# Patient Record
Sex: FEMALE | Race: WHITE | NOT HISPANIC OR LATINO | Employment: OTHER | URBAN - NONMETROPOLITAN AREA
[De-identification: names, ages, dates, MRNs, and addresses within clinical notes are randomized per-mention and may not be internally consistent; named-entity substitution may affect disease eponyms.]

---

## 2018-02-16 ENCOUNTER — OUTSIDE FACILITY SERVICE (OUTPATIENT)
Dept: INTERNAL MEDICINE | Facility: CLINIC | Age: 83
End: 2018-02-16

## 2018-02-16 PROCEDURE — 99305 1ST NF CARE MODERATE MDM 35: CPT | Performed by: INTERNAL MEDICINE

## 2018-03-25 ENCOUNTER — OUTSIDE FACILITY SERVICE (OUTPATIENT)
Dept: INTERNAL MEDICINE | Facility: CLINIC | Age: 83
End: 2018-03-25

## 2018-03-25 PROCEDURE — 99308 SBSQ NF CARE LOW MDM 20: CPT | Performed by: INTERNAL MEDICINE

## 2018-05-07 ENCOUNTER — LAB REQUISITION (OUTPATIENT)
Dept: LAB | Facility: HOSPITAL | Age: 83
End: 2018-05-07

## 2018-05-07 DIAGNOSIS — Z00.00 ROUTINE GENERAL MEDICAL EXAMINATION AT A HEALTH CARE FACILITY: ICD-10-CM

## 2018-05-07 DIAGNOSIS — E86.0 DEHYDRATION: ICD-10-CM

## 2018-05-07 LAB
ANION GAP SERPL CALCULATED.3IONS-SCNC: 13 MMOL/L (ref 3–11)
BUN BLD-MCNC: 42 MG/DL (ref 9–23)
BUN/CREAT SERPL: 22.1 (ref 7–25)
CALCIUM SPEC-SCNC: 9.6 MG/DL (ref 8.7–10.4)
CHLORIDE SERPL-SCNC: 107 MMOL/L (ref 99–109)
CO2 SERPL-SCNC: 28 MMOL/L (ref 20–31)
CREAT BLD-MCNC: 1.9 MG/DL (ref 0.6–1.3)
GFR SERPL CREATININE-BSD FRML MDRD: 25 ML/MIN/1.73
GLUCOSE BLD-MCNC: 146 MG/DL (ref 70–100)
POTASSIUM BLD-SCNC: 4.2 MMOL/L (ref 3.5–5.5)
SODIUM BLD-SCNC: 148 MMOL/L (ref 132–146)

## 2018-05-07 PROCEDURE — 80048 BASIC METABOLIC PNL TOTAL CA: CPT | Performed by: INTERNAL MEDICINE

## 2018-08-05 ENCOUNTER — OUTSIDE FACILITY SERVICE (OUTPATIENT)
Dept: INTERNAL MEDICINE | Facility: CLINIC | Age: 83
End: 2018-08-05

## 2018-08-05 PROCEDURE — 99309 SBSQ NF CARE MODERATE MDM 30: CPT | Performed by: INTERNAL MEDICINE

## 2018-09-13 RX ORDER — LORAZEPAM 0.5 MG/1
TABLET ORAL
Qty: 30 TABLET | Refills: 5 | Status: SHIPPED | OUTPATIENT
Start: 2018-09-13 | End: 2018-09-17 | Stop reason: SDUPTHER

## 2018-09-17 RX ORDER — LORAZEPAM 0.5 MG/1
0.5 TABLET ORAL 3 TIMES DAILY PRN
Qty: 90 TABLET | Refills: 5 | Status: SHIPPED | OUTPATIENT
Start: 2018-09-17 | End: 2018-12-18

## 2018-09-30 ENCOUNTER — NURSING HOME (OUTPATIENT)
Dept: INTERNAL MEDICINE | Facility: CLINIC | Age: 83
End: 2018-09-30

## 2018-09-30 DIAGNOSIS — I10 ESSENTIAL HYPERTENSION: Primary | ICD-10-CM

## 2018-09-30 PROCEDURE — 99308 SBSQ NF CARE LOW MDM 20: CPT | Performed by: INTERNAL MEDICINE

## 2018-10-22 NOTE — PROGRESS NOTES
Nursing Home Follow Up       Sharad Bell, DO ?  Jaylene Polanco, APRN ?  [address] [facility],[place] [address]  Phone: [phone]  Fax: [phone] Bri Burns MD[x]   Salvador Lidia, DO ?  [address] [place] [address]  Phone: [phone]  Fax: [phone]     PATIENT NAME: Roxann Brooks                                                                          YOB: 1929           DATE OF SERVICE: 09/30/2018  FACILITY:  [] [place]   [] [facility]   [x] [facility] [place]   [] [facility]   [] Other   ______________________________________________________________________     CHIEF COMPLAINT:  Hypertension        HISTORY OF PRESENT ILLNESS:   Asians evaluated, record reviewed, case discussed with staff.  Since last visit, no reported fever, chills, abdominal pain or cardiac respiratory symptoms.  During my visit, patient presently stated that she feels [okay]      PAST MEDICAL & SURGICAL HISTORY:   · Chronic essential hypertension  · Type 2 diabetes mellitus  · Idiopathic hypothyroidism  · Osteoporosis  · B12 deficiency  · Alzheimer's dementia  · Chronic depression  · History of bimalleolar right ankle fracture      MEDICATIONS:  I have reviewed and reconciled the patients medication list in the patients chart at the skilled nursing facility today.        ALLERGIES:  No known drug allergies       SOCIAL HISTORY:  Social History     Social History   • Marital status:      Spouse name: N/A   • Number of children: N/A   • Years of education: N/A     Occupational History   • Not on file.     Social History Main Topics   • Smoking status: Not on file   • Smokeless tobacco: Not on file   • Alcohol use Not on file   • Drug use: Unknown   • Sexual activity: Not on file     Other Topics Concern   • Not on file     Social History Narrative   • No narrative on file       FAMILY HISTORY:  Reviewed, not contributory to this visit       REVIEW OF SYSTEMS:  Review of Systems   Constitutional:  Negative.    HENT: Negative.    Respiratory: Negative.    Cardiovascular: Negative.    Gastrointestinal: Negative.    Neurological: Negative.    Psychiatric/Behavioral: Negative.          PHYSICAL EXAMINATION:   VITAL SIGNS: /68, HR 74/m, RR 16/m, temperature 98.1°F, O2 sats 96% on room air    Physical Exam   Constitutional: She appears well-developed and well-nourished.   HENT:   Head: Normocephalic and atraumatic.   Eyes: Pupils are equal, round, and reactive to light. EOM are normal.   Neck: Normal range of motion. Neck supple.   Cardiovascular: Normal rate, regular rhythm and normal heart sounds.    Pulmonary/Chest: Effort normal and breath sounds normal.   Abdominal: Soft. Bowel sounds are normal.   Musculoskeletal: Normal range of motion.   Neurological: She is alert.   Skin: Skin is warm and dry.   Psychiatric: She has a normal mood and affect.         RECORDS REVIEW:   I have reviewed Asians record including labs done July 2018 which were unremarkable      ASSESSMENT   · Chronic essential hypertension  · Type 2 diabetes mellitus  · Alzheimer's dementia      PLAN  Continue encouraging by mouth fluids, repeat CBC and CMP.  Otherwise continue current management with close monitoring of blood pressure and blood glucose levels.    [x]  Discussed Patient in detail with nursing/staff, addressed all needs today.     [x]  Plan of Care Reviewed   []  PT/OT Reviewed   []  Order Changes  []  Discharge Plans Reviewed  []  Advance Directive on file with Nursing Home.   []  POA on file with Nursing Home.    [x]  Code Status listed on patients chart at the nursing home facility.         Bri Burns MD.  10/7/2018

## 2018-12-18 RX ORDER — LORAZEPAM 0.5 MG/1
0.5 TABLET ORAL NIGHTLY
Qty: 30 TABLET | Refills: 5 | Status: SHIPPED | OUTPATIENT
Start: 2018-12-18 | End: 2019-01-01

## 2018-12-19 ENCOUNTER — NURSING HOME (OUTPATIENT)
Dept: INTERNAL MEDICINE | Facility: CLINIC | Age: 83
End: 2018-12-19

## 2018-12-19 DIAGNOSIS — I10 ESSENTIAL HYPERTENSION: Primary | ICD-10-CM

## 2018-12-19 PROCEDURE — 99308 SBSQ NF CARE LOW MDM 20: CPT | Performed by: INTERNAL MEDICINE

## 2019-01-01 ENCOUNTER — NURSING HOME (OUTPATIENT)
Dept: INTERNAL MEDICINE | Facility: CLINIC | Age: 84
End: 2019-01-01

## 2019-01-01 ENCOUNTER — NURSING HOME (OUTPATIENT)
Dept: FAMILY MEDICINE CLINIC | Facility: CLINIC | Age: 84
End: 2019-01-01

## 2019-01-01 VITALS
HEART RATE: 68 BPM | SYSTOLIC BLOOD PRESSURE: 106 MMHG | RESPIRATION RATE: 16 BRPM | OXYGEN SATURATION: 95 % | WEIGHT: 143 LBS | DIASTOLIC BLOOD PRESSURE: 64 MMHG

## 2019-01-01 DIAGNOSIS — F02.80 LATE ONSET ALZHEIMER'S DISEASE WITHOUT BEHAVIORAL DISTURBANCE (HCC): ICD-10-CM

## 2019-01-01 DIAGNOSIS — Z78.9 IMPAIRED MOBILITY AND ADLS: Primary | ICD-10-CM

## 2019-01-01 DIAGNOSIS — Z74.09 IMPAIRED MOBILITY AND ADLS: Primary | ICD-10-CM

## 2019-01-01 DIAGNOSIS — E11.9 TYPE 2 DIABETES MELLITUS WITHOUT COMPLICATION, WITHOUT LONG-TERM CURRENT USE OF INSULIN (HCC): ICD-10-CM

## 2019-01-01 DIAGNOSIS — G30.1 LATE ONSET ALZHEIMER'S DISEASE WITHOUT BEHAVIORAL DISTURBANCE (HCC): ICD-10-CM

## 2019-01-01 DIAGNOSIS — I10 ESSENTIAL HYPERTENSION: ICD-10-CM

## 2019-01-01 DIAGNOSIS — R63.0 ANOREXIA: ICD-10-CM

## 2019-01-01 DIAGNOSIS — Z78.9 IMPAIRED MOBILITY AND ADLS: ICD-10-CM

## 2019-01-01 DIAGNOSIS — R63.4 WEIGHT LOSS: Primary | ICD-10-CM

## 2019-01-01 DIAGNOSIS — E03.9 ACQUIRED HYPOTHYROIDISM: ICD-10-CM

## 2019-01-01 DIAGNOSIS — Z74.09 IMPAIRED MOBILITY AND ADLS: ICD-10-CM

## 2019-01-01 DIAGNOSIS — R54 AGE-RELATED PHYSICAL DEBILITY: Primary | Chronic | ICD-10-CM

## 2019-01-01 DIAGNOSIS — R63.4 WEIGHT LOSS: ICD-10-CM

## 2019-01-01 DIAGNOSIS — R54 AGE-RELATED PHYSICAL DEBILITY: Chronic | ICD-10-CM

## 2019-01-01 DIAGNOSIS — R60.0 EDEMA OF BOTH LOWER EXTREMITIES: ICD-10-CM

## 2019-01-01 DIAGNOSIS — R63.0 DECREASED APPETITE: ICD-10-CM

## 2019-01-01 DIAGNOSIS — I10 ESSENTIAL HYPERTENSION: Primary | ICD-10-CM

## 2019-01-01 DIAGNOSIS — I87.2 CHRONIC VENOUS INSUFFICIENCY: ICD-10-CM

## 2019-01-01 DIAGNOSIS — N18.30 CKD (CHRONIC KIDNEY DISEASE) STAGE 3, GFR 30-59 ML/MIN (HCC): ICD-10-CM

## 2019-01-01 DIAGNOSIS — R05.9 COUGH: Primary | ICD-10-CM

## 2019-01-01 PROCEDURE — 99309 SBSQ NF CARE MODERATE MDM 30: CPT | Performed by: FAMILY MEDICINE

## 2019-01-01 PROCEDURE — 99308 SBSQ NF CARE LOW MDM 20: CPT | Performed by: NURSE PRACTITIONER

## 2019-01-01 PROCEDURE — 99309 SBSQ NF CARE MODERATE MDM 30: CPT | Performed by: NURSE PRACTITIONER

## 2019-01-01 PROCEDURE — 99308 SBSQ NF CARE LOW MDM 20: CPT | Performed by: INTERNAL MEDICINE

## 2019-01-01 RX ORDER — LORAZEPAM 0.5 MG/1
0.5 TABLET ORAL NIGHTLY
Qty: 30 TABLET | Refills: 5 | Status: SHIPPED | OUTPATIENT
Start: 2019-01-01

## 2019-02-06 NOTE — PROGRESS NOTES
Nursing Home Follow Up       Sharad Bell, DO ?  Jaylene Polanco, APRN ?  [address] [facility],[place] [address]  Phone: [phone]  Fax: [phone] Bri Burns MD[x]   Salvador Murillo, DO ?  [address] [place] [address]  Phone: [phone]  Fax: [phone]     PATIENT NAME: Roxann Brooks                                                                          YOB: 1929           DATE OF SERVICE: 12/19/2018  FACILITY:  [] [place]   [] [place]   [x] [facility]   [] [facility]   [] Other   ______________________________________________________________________     CHIEF COMPLAINT:  Hypertension        HISTORY OF PRESENT ILLNESS:   Asians evaluated, record reviewed, case discussed with staff.  Since last visit, no reported fever, chills, abdominal pain or cardiac respiratory symptoms.  During my visit, patient presently stated that she feels [okay]      PAST MEDICAL & SURGICAL HISTORY:   · Chronic essential hypertension  · Type 2 diabetes mellitus  · Idiopathic hypothyroidism  · Osteoporosis  · B12 deficiency  · Alzheimer's dementia  · Chronic depression  · History of bimalleolar right ankle fracture      MEDICATIONS:  I have reviewed and reconciled the patients medication list in the patients chart at the skilled nursing facility today.        ALLERGIES:  No known drug allergies       SOCIAL HISTORY:  Social History     Socioeconomic History   • Marital status:      Spouse name: Not on file   • Number of children: Not on file   • Years of education: Not on file   • Highest education level: Not on file   Social Needs   • Financial resource strain: Not on file   • Food insecurity - worry: Not on file   • Food insecurity - inability: Not on file   • Transportation needs - medical: Not on file   • Transportation needs - non-medical: Not on file   Occupational History   • Not on file   Tobacco Use   • Smoking status: Not on file   Substance and Sexual Activity   • Alcohol use:  Not on file   • Drug use: Not on file   • Sexual activity: Not on file   Other Topics Concern   • Not on file   Social History Narrative   • Not on file       FAMILY HISTORY:  Reviewed, not contributory to this visit       REVIEW OF SYSTEMS:  Review of Systems   Constitutional: Negative.    HENT: Negative.    Respiratory: Negative.    Cardiovascular: Negative.    Gastrointestinal: Negative.    Neurological: Negative.    Psychiatric/Behavioral: Negative.          PHYSICAL EXAMINATION:   VITAL SIGNS: /70, HR 84/m, RR 18/m, temperature 98.4°F, O2 sats 94% on room air    Physical Exam   Constitutional: She appears well-developed and well-nourished.   HENT:   Head: Normocephalic and atraumatic.   Eyes: EOM are normal. Pupils are equal, round, and reactive to light.   Neck: Normal range of motion. Neck supple.   Cardiovascular: Normal rate, regular rhythm and normal heart sounds.   Pulmonary/Chest: Effort normal and breath sounds normal.   Abdominal: Soft. Bowel sounds are normal.   Musculoskeletal: Normal range of motion.   Neurological: She is alert.   Skin: Skin is warm and dry.   Psychiatric: She has a normal mood and affect.         RECORDS REVIEW:   I have reviewed Asians record including labs done July 2018 which were unremarkable      ASSESSMENT   · Chronic essential hypertension  · Type 2 diabetes mellitus  · Alzheimer's dementia      PLAN  Given patient's stable condition, will continue current management, encourage by mouth fluids, monitor blood pressure and blood glucose levels.  Further plans will modified based on patient's progress.    [x]  Discussed Patient in detail with nursing/staff, addressed all needs today.     [x]  Plan of Care Reviewed   []  PT/OT Reviewed   []  Order Changes  []  Discharge Plans Reviewed  []  Advance Directive on file with Nursing Home.   []  POA on file with Nursing Home.    [x]  Code Status listed on patients chart at the nursing home facility.         Bri Burns,  MD.  12/26/2018

## 2019-02-08 NOTE — PROGRESS NOTES
Nursing Home Follow Up       Sharad Bell, DO ?  Jaylene Polanco, APRN ?  [address] [facility],[place] [address]  Phone: [phone]  Fax: [phone] Bri Burns MD[x]   Salvador Murillo, DO ?  [address] [place] [address]  Phone: [phone]  Fax: [phone]     PATIENT NAME: Roxann Brooks                                                                          YOB: 1929           DATE OF SERVICE: January 12, 2019  FACILITY:  [] [place]   [] [place]   [x] [facility]   [] [facility]   [] Other   ______________________________________________________________________     CHIEF COMPLAINT:  Hypertension        HISTORY OF PRESENT ILLNESS:   Patient evaluated, record reviewed, case discussed with staff.  Since last visit, patient continued to be comfortable and in stable condition.  No reported acute systemic issues.  During my visit, patient denied discomfort.    PAST MEDICAL & SURGICAL HISTORY:   · Chronic essential hypertension  · Type 2 diabetes mellitus  · Idiopathic hypothyroidism  · Osteoporosis  · B12 deficiency  · Alzheimer's dementia  · Chronic depression  · History of bimalleolar right ankle fracture      MEDICATIONS:  I have reviewed and reconciled the patients medication list in the patients chart at the skilled nursing facility today.        ALLERGIES:  No known drug allergies       SOCIAL HISTORY:  Social History     Socioeconomic History   • Marital status:      Spouse name: Not on file   • Number of children: Not on file   • Years of education: Not on file   • Highest education level: Not on file   Social Needs   • Financial resource strain: Not on file   • Food insecurity - worry: Not on file   • Food insecurity - inability: Not on file   • Transportation needs - medical: Not on file   • Transportation needs - non-medical: Not on file   Occupational History   • Not on file   Tobacco Use   • Smoking status: Not on file   Substance and Sexual Activity   •  Alcohol use: Not on file   • Drug use: Not on file   • Sexual activity: Not on file   Other Topics Concern   • Not on file   Social History Narrative   • Not on file       FAMILY HISTORY:  Reviewed, not contributory to this visit       REVIEW OF SYSTEMS:  Review of Systems   Constitutional: Negative.    HENT: Negative.    Respiratory: Negative.    Cardiovascular: Negative.    Gastrointestinal: Negative.    Neurological: Negative.    Psychiatric/Behavioral: Negative.        PHYSICAL EXAMINATION:   VITAL SIGNS: /70, HR 84/m, RR 18/m, temperature 98.4°F, O2 sats 94% on room air    Physical Exam   Constitutional: She appears well-developed and well-nourished.   HENT:   Head: Normocephalic and atraumatic.   Eyes: EOM are normal. Pupils are equal, round, and reactive to light.   Neck: Normal range of motion. Neck supple.   Cardiovascular: Normal rate, regular rhythm and normal heart sounds.   Pulmonary/Chest: Effort normal and breath sounds normal.   Abdominal: Soft. Bowel sounds are normal.   Musculoskeletal: Normal range of motion.   Neurological: She is alert.   Skin: Skin is warm and dry.   Psychiatric: She has a normal mood and affect.       ASSESSMENT   · Chronic essential hypertension  · Type 2 diabetes mellitus  · Alzheimer's dementia      PLAN  Continue blood pressure and blood glucose monitoring.  Continue chronic medical management as documented per record.  Further plans will modified based on patient's progress.    [x]  Discussed Patient in detail with nursing/staff, addressed all needs today.     [x]  Plan of Care Reviewed   []  PT/OT Reviewed   []  Order Changes  []  Discharge Plans Reviewed  []  Advance Directive on file with Nursing Home.   []  POA on file with Nursing Home.    [x]  Code Status listed on patients chart at the nursing home facility.       Bri Burns MD.  January 19, 2019

## 2019-04-02 PROBLEM — E11.9 TYPE 2 DIABETES MELLITUS WITHOUT COMPLICATION, WITHOUT LONG-TERM CURRENT USE OF INSULIN (HCC): Status: ACTIVE | Noted: 2019-01-01

## 2019-04-02 PROBLEM — F02.80 LATE ONSET ALZHEIMER'S DISEASE WITHOUT BEHAVIORAL DISTURBANCE (HCC): Status: ACTIVE | Noted: 2019-01-01

## 2019-04-02 PROBLEM — G30.1 LATE ONSET ALZHEIMER'S DISEASE WITHOUT BEHAVIORAL DISTURBANCE (HCC): Status: ACTIVE | Noted: 2019-01-01

## 2019-04-02 PROBLEM — I10 ESSENTIAL HYPERTENSION: Status: ACTIVE | Noted: 2019-01-01

## 2019-04-02 PROBLEM — E03.9 ACQUIRED HYPOTHYROIDISM: Status: ACTIVE | Noted: 2019-01-01

## 2019-04-02 NOTE — PROGRESS NOTES
Nursing Home Follow Up Note      Sharad Bell DO []   SP[facility] [x]  [address] [place] [address]  Phone: [phone]  Fax: [phone] Bri Burns MD []    Salvador Murillo DO []   [address] [place] [address]  Phone: [phone]  Fax: [phone]     PATIENT NAME: Roxann Brooks                                                                          YOB: 1929           DATE OF SERVICE: 04/02/2019  FACILITY:  [][place]   [] [place]   [x] [facility]   [] [facility]   [] Other ______________________________________________________________________      CHIEF COMPLAINT:  CMM and LTC      HISTORY OF PRESENT ILLNESS:   Routine visit for coordination of LTC needs and chronic conditions:HTN, DM, Dementia, Hypothyroidism    PAST MEDICAL & SURGICAL HISTORY:   No past medical history on file.   No past surgical history on file.      MEDICATIONS:  I have reviewed and reconciled the patients medication list in the patients chart at the skilled nursing facility today.      ALLERGIES:    Allergies not on file      SOCIAL HISTORY:    Social History     Socioeconomic History   • Marital status:      Spouse name: Not on file   • Number of children: Not on file   • Years of education: Not on file   • Highest education level: Not on file       FAMILY HISTORY:    No family history on file.    REVIEW OF SYSTEMS:    Review of Systems   Constitutional: Negative for activity change, appetite change, chills, diaphoresis, fatigue, fever, unexpected weight gain and unexpected weight loss.   HENT: Negative for congestion, ear pain, mouth sores, nosebleeds, postnasal drip, rhinorrhea, sinus pressure, sneezing, sore throat and trouble swallowing.    Eyes: Negative for blurred vision, pain, discharge, redness, itching and visual disturbance.   Respiratory: Negative for apnea, cough, choking, chest tightness, shortness of breath, wheezing and stridor.    Cardiovascular: Negative for  chest pain, palpitations and leg swelling.   Gastrointestinal: Negative for abdominal distention, abdominal pain, blood in stool, constipation, diarrhea, nausea, vomiting, GERD and indigestion.   Endocrine: Negative for polydipsia and polyuria.   Genitourinary: Negative for urinary incontinence, decreased urine volume, difficulty urinating, dysuria, flank pain, frequency, hematuria and urgency.   Musculoskeletal: Positive for arthralgias. Negative for back pain, gait problem, joint swelling and myalgias.   Skin: Negative for color change, dry skin, rash and skin lesions.   Allergic/Immunologic: Negative for environmental allergies.   Neurological: Positive for weakness, memory problem and confusion. Negative for dizziness, seizures and speech difficulty.   Psychiatric/Behavioral: Negative for behavioral problems, dysphoric mood, hallucinations, sleep disturbance and depressed mood. The patient is not nervous/anxious.          PHYSICAL EXAMINATION:   VITAL SIGNS: BP: 126/61    HR: 70     RR: 16     T: 97.2     Wt: 156    Physical Exam   Constitutional: She appears well-developed and well-nourished.   HENT:   Head: Normocephalic.   Right Ear: External ear normal.   Left Ear: External ear normal.   Nose: Nose normal.   Eyes: Conjunctivae are normal.   Neck: Normal range of motion.   Cardiovascular: Normal rate, regular rhythm, normal heart sounds and intact distal pulses.   Pulmonary/Chest: Effort normal and breath sounds normal. No respiratory distress. She has no wheezes. She has no rales.   Abdominal: Soft. Bowel sounds are normal. She exhibits no distension and no mass. There is no tenderness. No hernia.   Musculoskeletal: She exhibits no edema.   NROM all major joints    LE weakness   Neurological: She is alert.   Skin: Skin is warm and dry. No rash noted.   Psychiatric: She has a normal mood and affect. Her behavior is normal.   Nursing note and vitals reviewed.      RECORDS REVIEW:   I have reviewed and  interpreted the following lab test results  obtained at the time of the visit today.     ASSESSMENT     Diagnoses and all orders for this visit:    Essential hypertension    Type 2 diabetes mellitus without complication, without long-term current use of insulin (CMS/[facility])    CKD (chronic kidney disease) stage 3, GFR 30-59 ml/min (CMS/[facility])    Acquired hypothyroidism    Late onset Alzheimer's disease without behavioral disturbance        PLAN    BP at goal on current medications.    DM stable with no reports of cyclical HG. A1C 7.5 on 2/5/19.    GFR 30, Creat 1.6 on 1/8/19.     Hypothyroidism stable on current dose of Synthroid.     Alzheimer's symptoms stable with no acute behavior changes. Appetite and weight stable.     Staff to continue supportive care for all ADLs.    [x]  Discussed Patient in detail with nursing/staff, addressed all needs today.     [x]  Plan of Care Reviewed   []  PT/OT Reviewed   []  Order Changes  []  Discharge Plans Reviewed  [x]  Advance Directive on file with Nursing Home.   [x]  POA on file with Nursing Home.   [x]  Code Status listed: []  Full Code   []  DNR              SPENSER Donnelly.

## 2019-04-25 NOTE — PROGRESS NOTES
Nursing Home Follow Up Note      Sharad Bell DO []   [facility] [x]  [address] [place] [address]  Phone: [phone]  Fax: [phone] Bri Burns MD []    Salvador Murillo DO []   [address] [place] [address]  Phone: [phone]  Fax: [phone]     PATIENT NAME: Roxann Brooks                                                                          YOB: 1929           DATE OF SERVICE: 04/23/2019  FACILITY:  [][place]   [] [place]   [x] [facility]   [] [facility]   [] Other ______________________________________________________________________      CHIEF COMPLAINT:  CMM and LTC      HISTORY OF PRESENT ILLNESS:   Routine visit for coordination of LTC needs and chronic conditions:HTN, DM, Dementia, Hypothyroidism    PAST MEDICAL & SURGICAL HISTORY:   No past medical history on file.   No past surgical history on file.      MEDICATIONS:  I have reviewed and reconciled the patients medication list in the patients chart at the skilled nursing facility today.      ALLERGIES:    Allergies not on file      SOCIAL HISTORY:    Social History     Socioeconomic History   • Marital status:      Spouse name: Not on file   • Number of children: Not on file   • Years of education: Not on file   • Highest education level: Not on file       FAMILY HISTORY:    No family history on file.    REVIEW OF SYSTEMS:    Review of Systems   Constitutional: Negative for activity change, appetite change, chills, diaphoresis, fatigue, fever, unexpected weight gain and unexpected weight loss.   HENT: Negative for congestion, ear pain, mouth sores, nosebleeds, postnasal drip, rhinorrhea, sinus pressure, sneezing, sore throat and trouble swallowing.    Eyes: Negative for blurred vision, pain, discharge, redness, itching and visual disturbance.   Respiratory: Negative for apnea, cough, choking, chest tightness, shortness of breath, wheezing and stridor.    Cardiovascular: Negative for  chest pain, palpitations and leg swelling.   Gastrointestinal: Negative for abdominal distention, abdominal pain, blood in stool, constipation, diarrhea, nausea, vomiting, GERD and indigestion.   Endocrine: Negative for polydipsia and polyuria.   Genitourinary: Negative for urinary incontinence, decreased urine volume, difficulty urinating, dysuria, flank pain, frequency, hematuria and urgency.   Musculoskeletal: Positive for arthralgias. Negative for back pain, gait problem, joint swelling and myalgias.   Skin: Negative for color change, dry skin, rash and skin lesions.   Allergic/Immunologic: Negative for environmental allergies.   Neurological: Positive for weakness, memory problem and confusion. Negative for dizziness, seizures and speech difficulty.   Psychiatric/Behavioral: Negative for behavioral problems, dysphoric mood, hallucinations, sleep disturbance and depressed mood. The patient is not nervous/anxious.          PHYSICAL EXAMINATION:   VITAL SIGNS: BP: 146/71    HR: 81     RR: 18     T: 97.2     Wt: 156    Physical Exam   Constitutional: She appears well-developed and well-nourished.   HENT:   Head: Normocephalic.   Right Ear: External ear normal.   Left Ear: External ear normal.   Nose: Nose normal.   Eyes: Conjunctivae are normal.   Neck: Normal range of motion.   Cardiovascular: Normal rate, regular rhythm, normal heart sounds and intact distal pulses.   Pulmonary/Chest: Effort normal and breath sounds normal. No respiratory distress. She has no wheezes. She has no rales.   Abdominal: Soft. Bowel sounds are normal. She exhibits no distension and no mass. There is no tenderness. No hernia.   Musculoskeletal: She exhibits no edema.   NROM all major joints    LE weakness   Neurological: She is alert.   Skin: Skin is warm and dry. No rash noted.   Psychiatric: She has a normal mood and affect. Her behavior is normal.   Nursing note and vitals reviewed.      RECORDS REVIEW:   I have reviewed and  interpreted the following lab test results  obtained at the time of the visit today.     ASSESSMENT     Diagnoses and all orders for this visit:    Essential hypertension    Type 2 diabetes mellitus without complication, without long-term current use of insulin (CMS/[facility])    Acquired hypothyroidism    Late onset Alzheimer's disease without behavioral disturbance        PLAN    BP at goal on current medications.    DM stable with no reports of cyclical HG. A1C 7.5 on 2/5/19.    GFR 30, Creat 1.6 on 1/8/19.     Hypothyroidism stable on current dose of Synthroid.     Alzheimer's symptoms stable with no acute behavior changes. Appetite and weight stable.     Staff to continue supportive care for all ADLs.    [x]  Discussed Patient in detail with nursing/staff, addressed all needs today.     [x]  Plan of Care Reviewed   []  PT/OT Reviewed   []  Order Changes  []  Discharge Plans Reviewed  [x]  Advance Directive on file with Nursing Home.   [x]  POA on file with Nursing Home.   [x]  Code Status listed: []  Full Code   []  DNR              SPENS[facility] Donnelly.

## 2019-05-30 NOTE — PROGRESS NOTES
Nursing Home Follow Up Note      Sharad Bell DO []   SPENSER Haywood [x]  [address] [place] [address]  Phone: [phone]  Fax: [phone] Bri Burns MD []    Salvador Murillo DO []   [address] [place] [address]  Phone: [phone]  Fax: [phone]     PATIENT NAME: Roxann Brooks                                                                          YOB: 1929           DATE OF SERVICE: 05/28/2019  FACILITY:  [][place]   [] [place]   [x] [facility]   [] [facility]   [] Other ______________________________________________________________________      CHIEF COMPLAINT:  Cough and congestion since yesterday    CMM and LTC      HISTORY OF PRESENT ILLNESS:   Per staff, patient has had cough and congestion since yesterday. She is not coughing anything up, VS stable.     Routine visit for coordination of LTC needs and chronic conditions:HTN, DM, Dementia, Hypothyroidism    PAST MEDICAL & SURGICAL HISTORY:   No past medical history on file.   No past surgical history on file.      MEDICATIONS:  I have reviewed and reconciled the patients medication list in the patients chart at the skilled nursing facility today.      ALLERGIES:    Allergies not on file      SOCIAL HISTORY:    Social History     Socioeconomic History   • Marital status:      Spouse name: Not on file   • Number of children: Not on file   • Years of education: Not on file   • Highest education level: Not on file       FAMILY HISTORY:    No family history on file.    REVIEW OF SYSTEMS:    Review of Systems   Constitutional: Negative for activity change, appetite change, chills, diaphoresis, fatigue, fever, unexpected weight gain and unexpected weight loss.   HENT: Positive for congestion. Negative for ear pain, mouth sores, nosebleeds, postnasal drip, rhinorrhea, sinus pressure, sneezing, sore throat and trouble swallowing.    Eyes: Negative for blurred vision, pain, discharge, redness, itching  and visual disturbance.   Respiratory: Positive for cough. Negative for apnea, choking, chest tightness, shortness of breath, wheezing and stridor.    Cardiovascular: Negative for chest pain, palpitations and leg swelling.   Gastrointestinal: Negative for abdominal distention, abdominal pain, blood in stool, constipation, diarrhea, nausea, vomiting, GERD and indigestion.   Endocrine: Negative for polydipsia and polyuria.   Genitourinary: Negative for urinary incontinence, decreased urine volume, difficulty urinating, dysuria, flank pain, frequency, hematuria and urgency.   Musculoskeletal: Positive for arthralgias. Negative for back pain, gait problem, joint swelling and myalgias.   Skin: Negative for color change, dry skin, rash and skin lesions.   Allergic/Immunologic: Negative for environmental allergies.   Neurological: Positive for weakness, memory problem and confusion. Negative for dizziness, seizures and speech difficulty.   Psychiatric/Behavioral: Negative for behavioral problems, dysphoric mood, hallucinations, sleep disturbance and depressed mood. The patient is not nervous/anxious.          PHYSICAL EXAMINATION:   VITAL SIGNS: BP: 143/71    HR: 77     RR: 18     T: 97.2   O2: 94%      Wt: 155    Physical Exam   Constitutional: She appears well-developed and well-nourished.   HENT:   Head: Normocephalic.   Right Ear: External ear normal.   Left Ear: External ear normal.   Nose: Nose normal.   Eyes: Conjunctivae are normal.   Neck: Normal range of motion.   Cardiovascular: Normal rate, regular rhythm, normal heart sounds and intact distal pulses.   Pulmonary/Chest: Effort normal and breath sounds normal. No respiratory distress. She has no wheezes. She has no rales.   Abdominal: Soft. Bowel sounds are normal. She exhibits no distension and no mass. There is no tenderness. No hernia.   Musculoskeletal: She exhibits no edema.   NROM all major joints    LE weakness   Neurological: She is alert.   Skin: Skin  is warm and dry. No rash noted.   Psychiatric: She has a normal mood and affect. Her behavior is normal.   Nursing note and vitals reviewed.      RECORDS REVIEW:   I have reviewed and interpreted the following lab test results  obtained at the time of the visit today. 5/9: TSH 2.5 T4 7.7, Hgb 12.6, Hct 39.3, GFR 30, Cr 1.6    ASSESSMENT     Diagnoses and all orders for this visit:    Cough    Essential hypertension    Type 2 diabetes mellitus without complication, without long-term current use of insulin (CMS/[facility])    Acquired hypothyroidism    Late onset Alzheimer's disease without behavioral disturbance    Impaired mobility and ADLs        PLAN    Start Robitussin 5 ml q 6 hours for 1 week and albuterol nebs q 6 hours prn, for treatment of her cough and congestion . Will monitor.     BP at goal on current medications.    DM stable with no reports of cyclical HG. A1C 7.5 on 2/5/19.    GFR 30, Creat 1.6 on 5/9/19.     Hypothyroidism stable on current dose of Synthroid. See labs above.     Alzheimer's symptoms stable with no acute behavior changes. Appetite and weight stable.     Staff to continue supportive care for all ADLs.    [x]  Discussed Patient in detail with nursing/staff, addressed all needs today.     [x]  Plan of Care Reviewed   []  PT/OT Reviewed   []  Order Changes  []  Discharge Plans Reviewed  [x]  Advance Directive on file with Nursing Home.   [x]  POA on file with Nursing Home.   [x]  Code Status listed: []  Full Code   []  DNR              SPENSER Donnelly.

## 2019-06-03 NOTE — PROGRESS NOTES
Nursing Home Follow Up       Sharad Bell, DO ?  Jaylene Polanco, APRN ?  [address] [facility],[place] [address]  Phone: [phone]  Fax: [phone] Bri Burns MD[x]   Salvador Lidia, DO ?  [address] [place] [address]  Phone: [phone]  Fax: [phone]     PATIENT NAME: Roxann Brooks                                                                          YOB: 1929           DATE OF SERVICE: 3/27/2019  FACILITY:  [] [place]   [] [place]   [x] [facility]   [] [facility]   [] Other   ______________________________________________________________________     CHIEF COMPLAINT:  Hypertension        HISTORY OF PRESENT ILLNESS:   Patient evaluated, record reviewed, case discussed with staff.  Patient continues to be in stable condition with no reported fever, chills or symptoms.  During today's visit, patient was calm after waking up from sleep.  Stated that she felt [okay].      PAST MEDICAL & SURGICAL HISTORY:   · Chronic essential hypertension  · Type 2 diabetes mellitus  · Idiopathic hypothyroidism  · Osteoporosis  · B12 deficiency  · Alzheimer's dementia  · Chronic depression  · History of bimalleolar right ankle fracture      MEDICATIONS:  I have reviewed and reconciled the patients medication list in the patients chart at the skilled nursing facility today.        ALLERGIES:  No known drug allergies       SOCIAL HISTORY:  Social History     Socioeconomic History   • Marital status:      Spouse name: Not on file   • Number of children: Not on file   • Years of education: Not on file   • Highest education level: Not on file       FAMILY HISTORY:  Reviewed, not contributory to this visit       REVIEW OF SYSTEMS:  Review of Systems   Constitutional: Negative.    HENT: Negative.    Respiratory: Negative.    Cardiovascular: Negative.    Gastrointestinal: Negative.    Neurological: Negative.    Psychiatric/Behavioral: Negative.        PHYSICAL EXAMINATION:   VITAL SIGNS:  /74, HR 82/m, RR 18/m, temperature 98.2°F    Physical Exam   Constitutional: She appears well-developed and well-nourished.   HENT:   Head: Normocephalic and atraumatic.   Eyes: EOM are normal. Pupils are equal, round, and reactive to light.   Neck: Normal range of motion. Neck supple.   Cardiovascular: Normal rate, regular rhythm and normal heart sounds.   Pulmonary/Chest: Effort normal and breath sounds normal.   Abdominal: Soft. Bowel sounds are normal.   Musculoskeletal: Normal range of motion.   Neurological: She is alert.   Skin: Skin is warm and dry.   Psychiatric: She has a normal mood and affect.       ASSESSMENT   · Chronic essential hypertension  · Type 2 diabetes mellitus  · Alzheimer's dementia      PLAN  Continue blood pressure and blood glucose monitoring.  Continue chronic medical management as documented per record.  Further plans will modified based on patient's progress.     Of note, I will transition care to Dr. Bell who will follow on further plans.  Facility staff are in the process of coordinating a seamless transfer.  Meanwhile, I will follow up with any upcoming issues or concerns.    [x]  Discussed Patient in detail with nursing/staff, addressed all needs today.     [x]  Plan of Care Reviewed   []  PT/OT Reviewed   []  Order Changes  []  Discharge Plans Reviewed  []  Advance Directive on file with Nursing Home.   []  POA on file with Nursing Home.    [x]  Code Status listed on patients chart at the nursing home facility.       Bri Burns MD.  3/27/2019

## 2019-06-21 PROBLEM — R60.0 EDEMA OF BOTH FEET: Status: ACTIVE | Noted: 2019-01-01

## 2019-06-21 NOTE — PROGRESS NOTES
Nursing Home Follow Up Note      Sharad Bell DO []   SPENSER Haywood [x]  [address] [place] [address]  Phone: [phone]  Fax: [phone] Bri Burns MD []    Salvador Murillo DO []   [address] [place] [address]  Phone: [phone]  Fax: [phone]     PATIENT NAME: Roxann Brooks                                                                          YOB: 1929           DATE OF SERVICE: 6/21/2019  FACILITY:  [][place]   [] [place]   [x] [facility]   [] [facility]   [] Other ______________________________________________________________________      CHIEF COMPLAINT:  Increased edema in BLE.    CMM and LTC      HISTORY OF PRESENT ILLNESS:   Per staff, patient has increased bilateral lower extremity swelling. Patient denies SOB or wheezing.     Routine visit for coordination of LTC needs and chronic conditions:HTN, DM, Dementia, Hypothyroidism    PAST MEDICAL & SURGICAL HISTORY:   No past medical history on file.   No past surgical history on file.      MEDICATIONS:  I have reviewed and reconciled the patients medication list in the patients chart at the skilled nursing facility today.      ALLERGIES:    Allergies not on file      SOCIAL HISTORY:    Social History     Socioeconomic History   • Marital status:      Spouse name: Not on file   • Number of children: Not on file   • Years of education: Not on file   • Highest education level: Not on file       FAMILY HISTORY:    No family history on file.    REVIEW OF SYSTEMS:    Review of Systems   Constitutional: Negative for activity change, appetite change, chills, diaphoresis, fatigue, fever, unexpected weight gain and unexpected weight loss.   HENT: Negative for congestion, ear pain, mouth sores, nosebleeds, postnasal drip, rhinorrhea, sinus pressure, sneezing, sore throat and trouble swallowing.    Eyes: Negative for blurred vision, pain, discharge, redness, itching and visual disturbance.    Respiratory: Negative for apnea, cough, choking, chest tightness, shortness of breath, wheezing and stridor.    Cardiovascular: Positive for leg swelling. Negative for chest pain and palpitations.   Gastrointestinal: Negative for abdominal distention, abdominal pain, blood in stool, constipation, diarrhea, nausea, vomiting, GERD and indigestion.   Endocrine: Negative for polydipsia and polyuria.   Genitourinary: Negative for urinary incontinence, decreased urine volume, difficulty urinating, dysuria, flank pain, frequency, hematuria and urgency.   Musculoskeletal: Positive for arthralgias. Negative for back pain, gait problem, joint swelling and myalgias.   Skin: Negative for color change, dry skin, rash and skin lesions.   Allergic/Immunologic: Negative for environmental allergies.   Neurological: Positive for weakness, memory problem and confusion. Negative for dizziness, seizures and speech difficulty.   Psychiatric/Behavioral: Negative for behavioral problems, dysphoric mood, hallucinations, sleep disturbance and depressed mood. The patient is not nervous/anxious.    All other systems reviewed and are negative.        PHYSICAL EXAMINATION:   VITAL SIGNS: BP: 146/64   HR: 63    RR: 18     T: 97.2   O2: 92%      Wt: 155    Physical Exam   Constitutional: She appears well-developed and well-nourished.   HENT:   Head: Normocephalic.   Right Ear: External ear normal.   Left Ear: External ear normal.   Nose: Nose normal.   Eyes: Conjunctivae are normal.   Neck: Normal range of motion.   Cardiovascular: Normal rate, regular rhythm, normal heart sounds and intact distal pulses.   Bilateral feet +2 edema    Pulmonary/Chest: Effort normal and breath sounds normal. No respiratory distress. She has no wheezes. She has no rales.   Abdominal: Soft. Bowel sounds are normal. She exhibits no distension and no mass. There is no tenderness. No hernia.   Musculoskeletal: She exhibits no edema.   NROM all major joints    LE  weakness   Neurological: She is alert.   Skin: Skin is warm and dry. No rash noted.   Psychiatric: She has a normal mood and affect. Her behavior is normal.   Nursing note and vitals reviewed.      RECORDS REVIEW:   I have reviewed and interpreted the following lab test results  obtained at the time of the visit today. 5/9: TSH 2.5 T4 7.7, Hgb 12.6, Hct 39.3, GFR 30, Cr 1.6 BUN 33 A1C 7.7     ASSESSMENT     Diagnoses and all orders for this visit:    Edema of both lower extremities    Essential hypertension    Type 2 diabetes mellitus without complication, without long-term current use of insulin (CMS/[facility])    Late onset Alzheimer's disease without behavioral disturbance        PLAN    Start Lasix 20 mg for 3 days. Notified staff and patient to ensure she drinks adequate fluids so she does not become dehydrated.    Ordered ROSI hose for patient but if she will not wear them asked nursing staff to wrap with an ace wrap from the knee to ankle during day and take off at night.      BP at goal on current medications.    DM stable with no reports of cyclical HG. A1C 7.5 on 2/5/19.    Alzheimer's symptoms stable with no acute behavior changes. Appetite and weight stable.     Staff to continue supportive care for all ADLs.    [x]  Discussed Patient in detail with nursing/staff, addressed all needs today.     [x]  Plan of Care Reviewed   []  PT/OT Reviewed   [x]  Order Changes  []  Discharge Plans Reviewed  [x]  Advance Directive on file with Nursing Home.   [x]  POA on file with Nursing Home.   [x]  Code Status listed: []  Full Code   []  DNR              SPENSER Donnelly.

## 2019-07-02 NOTE — PROGRESS NOTES
Nursing Home Follow Up Note      Sharad Bell DO []   SPENSER Haywood [x]  [address] [place] [address]  Phone: [phone]  Fax: [phone] Bri Burns MD []    Salvador Murillo DO []   [address] [place] [address]  Phone: [phone]  Fax: [phone]     PATIENT NAME: Roxann Brooks                                                                          YOB: 1929           DATE OF SERVICE: 07/02/2019  FACILITY:  [][place]   [] [place]   [[facility]] [facility]   [] [facility]   [] Other ______________________________________________________________________      CHIEF COMPLAINT:  F/u CMM and LTC      HISTORY OF PRESENT ILLNESS:   Routine visit for coordination of LTC needs and chronic conditions:HTN, DM, Dementia, Hypothyroidism.  Nursing reports no events or needs at this time    PAST MEDICAL & SURGICAL HISTORY:   No past medical history on file.   No past surgical history on file.      MEDICATIONS:  I have reviewed and reconciled the patients medication list in the patients chart at the skilled nursing facility today.      ALLERGIES:    Allergies not on file      SOCIAL HISTORY:    Social History     Socioeconomic History   • Marital status:      Spouse name: Not on file   • Number of children: Not on file   • Years of education: Not on file   • Highest education level: Not on file       FAMILY HISTORY:    No family history on file.    REVIEW OF SYSTEMS:    Review of Systems   Constitutional: Negative for activity change, appetite change, chills, diaphoresis, fatigue, fever, unexpected weight gain and unexpected weight loss.   HENT: Negative for congestion, ear pain, mouth sores, nosebleeds, postnasal drip, rhinorrhea, sinus pressure, sneezing, sore throat and trouble swallowing.    Eyes: Negative for blurred vision, pain, discharge, redness, itching and visual disturbance.   Respiratory: Negative for apnea, cough, choking, chest tightness, shortness of breath,  wheezing and stridor.    Cardiovascular: Positive for leg swelling. Negative for chest pain and palpitations.   Gastrointestinal: Negative for abdominal distention, abdominal pain, blood in stool, constipation, diarrhea, nausea, vomiting, GERD and indigestion.   Endocrine: Negative for polydipsia and polyuria.   Genitourinary: Negative for urinary incontinence, decreased urine volume, difficulty urinating, dysuria, flank pain, frequency, hematuria and urgency.   Musculoskeletal: Positive for arthralgias. Negative for back pain, gait problem, joint swelling and myalgias.   Skin: Negative for color change, dry skin, rash and skin lesions.   Allergic/Immunologic: Negative for environmental allergies.   Neurological: Positive for weakness, memory problem and confusion. Negative for dizziness, seizures and speech difficulty.   Psychiatric/Behavioral: Negative for behavioral problems, dysphoric mood, hallucinations, sleep disturbance and depressed mood. The patient is not nervous/anxious.    All other systems reviewed and are negative.        PHYSICAL EXAMINATION:   VITAL SIGNS: BP: 140/82   HR: 76    RR: 16     T: 97.7   O2: 93%      Wt: 155    Physical Exam   Constitutional: She appears well-developed and well-nourished.   HENT:   Head: Normocephalic.   Right Ear: External ear normal.   Left Ear: External ear normal.   Nose: Nose normal.   Eyes: Conjunctivae are normal.   Neck: Normal range of motion.   Cardiovascular: Normal rate, regular rhythm, normal heart sounds and intact distal pulses.   Bilateral feet +2 edema    Pulmonary/Chest: Effort normal and breath sounds normal. No respiratory distress. She has no wheezes. She has no rales.   Abdominal: Soft. Bowel sounds are normal. She exhibits no distension and no mass. There is no tenderness. No hernia.   Musculoskeletal: She exhibits no edema.   NROM all major joints    LE weakness   Neurological: She is alert.   Skin: Skin is warm and dry. No rash noted.    Psychiatric: She has a normal mood and affect. Her behavior is normal.   Nursing note and vitals reviewed.      RECORDS REVIEW:   I have reviewed and interpreted lab results    ASSESSMENT     Diagnoses and all orders for this visit:    Essential hypertension    Type 2 diabetes mellitus without complication, without long-term current use of insulin (CMS/[facility])    Acquired hypothyroidism    Late onset Alzheimer's disease without behavioral disturbance    Edema of both lower extremities        PLAN    BP at goal on current medications.    DM stable with no reports of cyclical HG. A1C 7.5 on 2/5/19.     Continue levothyroxine, TSH recheck as per protocol     Alzheimer's symptoms stable with no acute behavior changes. Appetite and weight stable.     Staff to continue supportive care for all ADLs.    [x]  Discussed Patient in detail with nursing/staff, addressed all needs today.     [x]  Plan of Care Reviewed   []  PT/OT Reviewed   []  Order Changes  []  Discharge Plans Reviewed  [x]  Advance Directive on file with Nursing Home.   [x]  POA on file with Nursing Home.   [x]  Code Status listed: []  Full Code   []  DNR

## 2019-08-30 PROBLEM — Z78.9 IMPAIRED MOBILITY AND ADLS: Status: ACTIVE | Noted: 2019-01-01

## 2019-08-30 PROBLEM — Z74.09 IMPAIRED MOBILITY AND ADLS: Status: ACTIVE | Noted: 2019-01-01

## 2019-08-30 NOTE — PROGRESS NOTES
Nursing Home Follow Up Note      Sharad Bell DO  []  SPENSER Haywood  []  SPENSER Cook [x]  [address] [facility], [place] [address]  Phone: [phone]  Fax: [phone] Bri Burns MD  []  Salvador Murillo DO  []  [address] [place] [address]  Phone: [phone]  Fax: [phone]     PATIENT NAME: Roxann Brooks                                                                          YOB: 1929           DATE OF SERVICE: 08/27/2019  FACILITY:   [] [place]    [] [place]    [x] [facility]     [] [facility]    [] Other ______________________________________________________________________     CHIEF COMPLAINT:  F/u DM, HTN, hypothyroidism, dementia      HISTORY OF PRESENT ILLNESS:   Seen for CMM and long term care needs.   No events per nursing    REVIEW OF SYSTEMS:  No pain but full ROS unable to be obtained due to mentation    PAST MEDICAL & SURGICAL HISTORY:   No past medical history on file.   No past surgical history on file.      MEDICATIONS:  I have reviewed and reconciled the patients medication list in the patients chart at the [facility] nursing [facility] today.      ALLERGIES:  I have reviewed allergies on file at the [facility]  Allergies not on file      SOCIAL HISTORY:  Social History     Socioeconomic History   • Marital status:      Spouse name: Not on file   • Number of children: Not on file   • Years of education: Not on file   • Highest education level: Not on file       PHYSICAL EXAMINATION:   VITAL SIGNS:   BP: 142/60, HR: 72, Resp: 18, O2 Sat: 92, Temp: 98.5, Weight: 132 (down 17lbs in 1 month)    Constitutional: No acute distress, awake, alert  HENT: NCAT, mucous membranes moist  Respiratory: Clear to auscultation bilaterally, respiratory effort normal   Cardiovascular: RRR, no murmurs, rubs, or gallops  Gastrointestinal: Positive bowel sounds, soft, nontender, nondistended  Psychiatric: Appropriate affect,  cooperative  Neurologic: alert and interactive  Skin: No rashes    RECORDS REVIEW:   I have reviewed labs available at time of visit.  The following are pertinent: a1C 8.9, TSH 2.5    ASSESSMENT     Diagnoses and all orders for this visit:    Essential hypertension    Acquired hypothyroidism    Type 2 diabetes mellitus without complication, without long-term current use of insulin (CMS/[facility])    Late onset Alzheimer's disease without behavioral disturbance    Impaired mobility and ADLs    Weight loss        PLAN    HTN  - currently diet controlled, BP stable off medication    DM  - glucose uncontrolled, stop Januiva, increase levemir, check glucose ACHS, add novolog sliding scale    Hypothyroidism  - TSH stable, continue levothyroxine    Dementia  Impaired ADL's  - mood and behavior stable  - staff to continue to assist with ADL's, medications, and overall care    ?Weight Loss  - per chart review weight is down 17 lbs in 1 month. Suspect this is an error. Weight patient. Today, will f/u    [x]  Discussed Patient in detail with nursing/staff, addressed all needs today.     [x]  Plan of Care Reviewed   []  PT/OT Reviewed   [x]  Order Changes  []  Discharge Plans Reviewed  [x]  Advance Directive on file with Nursing Home.   [x]  POA on file with Nursing Home.   [x]  Code Status: I have reviewed the CODE STATUS on file at the skilled nursing facility       SPENSER Cook.

## 2019-09-25 PROBLEM — R60.0 EDEMA OF BOTH LOWER EXTREMITIES: Status: RESOLVED | Noted: 2019-01-01 | Resolved: 2019-01-01

## 2019-09-25 PROBLEM — I87.2 CHRONIC VENOUS INSUFFICIENCY: Status: ACTIVE | Noted: 2019-01-01

## 2019-09-25 PROBLEM — R54 AGE-RELATED PHYSICAL DEBILITY: Chronic | Status: ACTIVE | Noted: 2019-01-01

## 2019-09-25 NOTE — PROGRESS NOTES
Nursing Home Progress Note        Sharad Bell,  [x]  Jaylene Polanco, APRN ?  [address] [facility] [facility], [place] [address]  Phone: [phone]  Fax: [phone] Bri Burns MD ?  Salvador Murillo DO ?  [address] [place] [address]  Phone: [phone]  Fax: [phone]     PATIENT NAME: Roxann Brooks                                                                          YOB: 1929           DATE OF SERVICE: 04/17/2019  FACILITY: []  [place]  [] [place]  [x]  [facility]  [] [facility]  []  Other ______________________________________________________________________     CHIEF COMPLAINT:  Impaired mobility and ADLs/age-related physical debility/chronic medical management      HISTORY OF PRESENT ILLNESS:     [x]  Follow Up visit for coordination of long term care issues and chronic medical management needs    PAST MEDICAL & SURGICAL HISTORY:   No past medical history on file.   No past surgical history on file.      MEDICATIONS:  I have reviewed and reconciled the patients medication list in the patients chart at the skilled nursing facility today.      ALLERGIES:    Allergies not on file      SOCIAL HISTORY:    Social History     Socioeconomic History   • Marital status:      Spouse name: Not on file   • Number of children: Not on file   • Years of education: Not on file   • Highest education level: Not on file       FAMILY HISTORY:    No family history on file.    REVIEW OF SYSTEMS:    Review of Systems   Constitutional: Negative for activity change, appetite change, chills, diaphoresis, fatigue, fever, unexpected weight gain and unexpected weight loss.   HENT: Negative for congestion, ear pain, mouth sores, nosebleeds, postnasal drip, rhinorrhea, sinus pressure, sneezing, sore throat and trouble swallowing.    Eyes: Negative for blurred vision, pain, discharge, redness, itching and visual disturbance.   Respiratory: Negative for apnea, cough, choking, chest tightness,  shortness of breath, wheezing and stridor.    Cardiovascular: Negative for chest pain, palpitations and leg swelling.   Gastrointestinal: Negative for abdominal distention, abdominal pain, blood in stool, constipation, diarrhea, nausea, vomiting, GERD and indigestion.   Endocrine: Negative for polydipsia and polyuria.   Genitourinary: Negative for urinary incontinence, decreased urine volume, difficulty urinating, dysuria, flank pain, frequency, hematuria and urgency.   Musculoskeletal: Positive for arthralgias. Negative for back pain, gait problem, joint swelling and myalgias.   Skin: Negative for color change, dry skin, rash and skin lesions.   Allergic/Immunologic: Negative for environmental allergies.   Neurological: Positive for weakness, memory problem and confusion. Negative for dizziness, seizures and speech difficulty.   Psychiatric/Behavioral: Negative for behavioral problems, dysphoric mood, hallucinations, sleep disturbance and depressed mood. The patient is not nervous/anxious.    PHYSICAL EXAMINATION:   VITAL SIGNS: /60, HR 64 bpm, RR 16, weight 156      Physical Exam   Constitutional: She appears well-developed and well-nourished.   HENT:   Head: Normocephalic and atraumatic.   Right Ear: External ear normal.   Left Ear: External ear normal.   Nose: Nose normal.   Mouth/Throat: Oropharynx is clear and moist.   Eyes: Conjunctivae and EOM are normal. Pupils are equal, round, and reactive to light. No scleral icterus.   Neck: Normal range of motion. Neck supple. No tracheal deviation present. No thyromegaly present.   Cardiovascular: Normal rate, regular rhythm, normal heart sounds and intact distal pulses. Exam reveals no gallop and no friction rub.   No murmur heard.  Pulmonary/Chest: Effort normal and breath sounds normal. No respiratory distress. She has no wheezes. She exhibits no tenderness.   Abdominal: Soft. Bowel sounds are normal. She exhibits no distension and no mass. There is no  tenderness. There is no rebound and no guarding. No hernia.   Musculoskeletal: She exhibits edema (Chronic gravity dependent venous insufficiency of lower extremities).   Frail general build.  Poor core strength/stability.    Neurological: She is alert.   Skin: Skin is warm and dry. Capillary refill takes 2 to 3 seconds. No rash noted. No erythema. No pallor.   Age-related atrophy of the skin.   Psychiatric: She has a normal mood and affect.   No acute changes.    Nursing note and vitals reviewed.        Urinary:     []  Continent  [x]  Incontinent  []  Retention  []  F/C     []  UTI w/treatment in progress       ASSESSMENT     Diagnoses and all orders for this visit:    Impaired mobility and ADLs    Age-related physical debility    Essential hypertension    Type 2 diabetes mellitus without complication, without long-term current use of insulin (CMS/[facility])    Late onset Alzheimer's disease without behavioral disturbance    Acquired hypothyroidism    Chronic venous insufficiency        PLAN  Supportive care for mobilization/transfer needs, as well as ADL assistance.  BP remains at goal.  Review of vital signs demonstrates relative hemodynamic stability.  Continue to follow her nutritional status.  Her behavior appears stable and without acute decompensation or issues reported from staff/nursing, continue cognitive behavioral therapy for any anxiety that should arise as a result of expected sundowning due to her advanced age and expected progression of her dementia.  Avoid prolonged fasting periods, maintain adequate hydration.  Strict blood glucose control, changes to her treatment regimen if needed.  Continue surveillance labs, adjustment of thyroid supplementation based on her TSH/free T4 results routinely.    [x]  Discussed Patient in detail with nursing/staff, addressed all needs today.     [x]  Plan of Care Reviewed   []  PT/OT Reviewed   []  Order Changes  []  Discharge Plans Reviewed   []  Code Status  Changes         Sharad Bell  04/17/2019

## 2019-09-25 NOTE — PROGRESS NOTES
Nursing Home Progress Note        Sharad Bell,  [x]  Jaylene Polanco, APRN ?  [address], [place] [address]  Phone: [phone]  Fax: [phone] Bri Burns MD ?  Salvador Murillo DO ?  [address] [place] [address]  Phone: [phone]  Fax: [phone]     PATIENT NAME: Roxann Brooks                                                                          YOB: 1929           DATE OF SERVICE: 04/17/2019  FACILITY: []  [place]  [] [place]  [x]  [facility]  [] [facility]  []  Other ______________________________________________________________________     CHIEF COMPLAINT:  Impaired mobility and ADLs/age-related physical debility/chronic medical management      HISTORY OF PRESENT ILLNESS:     [x]  Follow Up visit for coordination of long term care issues and chronic medical management needs    PAST MEDICAL & SURGICAL HISTORY:   No past medical history on file.   No past surgical history on file.      MEDICATIONS:  I have reviewed and reconciled the patients medication list in the patients chart at the skilled nursing facility today.      ALLERGIES:    Allergies not on file      SOCIAL HISTORY:    Social History     Socioeconomic History   • Marital status:      Spouse name: Not on file   • Number of children: Not on file   • Years of education: Not on file   • Highest education level: Not on file       FAMILY HISTORY:    No family history on file.    REVIEW OF SYSTEMS:    Review of Systems   Constitutional: Negative for activity change, appetite change, chills, diaphoresis, fatigue, fever, unexpected weight gain and unexpected weight loss.   HENT: Negative for congestion, ear pain, mouth sores, nosebleeds, postnasal drip, rhinorrhea, sinus pressure, sneezing, sore throat and trouble swallowing.    Eyes: Negative for blurred vision, pain, discharge, redness, itching and visual disturbance.   Respiratory: Negative for apnea, cough, choking, chest tightness,  shortness of breath, wheezing and stridor.    Cardiovascular: Negative for chest pain, palpitations and leg swelling.   Gastrointestinal: Negative for abdominal distention, abdominal pain, blood in stool, constipation, diarrhea, nausea, vomiting, GERD and indigestion.   Endocrine: Negative for polydipsia and polyuria.   Genitourinary: Negative for urinary incontinence, decreased urine volume, difficulty urinating, dysuria, flank pain, frequency, hematuria and urgency.   Musculoskeletal: Positive for arthralgias. Negative for back pain, gait problem, joint swelling and myalgias.   Skin: Negative for color change, dry skin, rash and skin lesions.   Allergic/Immunologic: Negative for environmental allergies.   Neurological: Positive for weakness, memory problem and confusion. Negative for dizziness, seizures and speech difficulty.   Psychiatric/Behavioral: Negative for behavioral problems, dysphoric mood, hallucinations, sleep disturbance and depressed mood. The patient is not nervous/anxious.    PHYSICAL EXAMINATION:   VITAL SIGNS: /71, HR 81 bpm, RR 18, weight 156      Physical Exam   Constitutional: She appears well-developed and well-nourished.   HENT:   Head: Normocephalic and atraumatic.   Right Ear: External ear normal.   Left Ear: External ear normal.   Nose: Nose normal.   Mouth/Throat: Oropharynx is clear and moist.   Eyes: Conjunctivae and EOM are normal. Pupils are equal, round, and reactive to light. No scleral icterus.   Neck: Normal range of motion. Neck supple. No tracheal deviation present. No thyromegaly present.   Cardiovascular: Normal rate, regular rhythm, normal heart sounds and intact distal pulses. Exam reveals no gallop and no friction rub.   No murmur heard.  Pulmonary/Chest: Effort normal and breath sounds normal. No respiratory distress. She has no wheezes. She exhibits no tenderness.   Abdominal: Soft. Bowel sounds are normal. She exhibits no distension and no mass. There is no  tenderness. There is no rebound and no guarding. No hernia.   Musculoskeletal: She exhibits edema (Chronic gravity dependent venous insufficiency of lower extremities).   Frail general build.  Poor core strength/stability.    Neurological: She is alert.   Skin: Skin is warm and dry. Capillary refill takes 2 to 3 seconds. No rash noted. No erythema. No pallor.   Age-related atrophy of the skin.   Psychiatric: She has a normal mood and affect.   No acute changes.    Nursing note and vitals reviewed.        Urinary:     []  Continent  [x]  Incontinent  []  Retention  []  F/C     []  UTI w/treatment in progress       ASSESSMENT     Diagnoses and all orders for this visit:    Impaired mobility and ADLs    Age-related physical debility    Acquired hypothyroidism    Type 2 diabetes mellitus without complication, without long-term current use of insulin (CMS/[facility])    Essential hypertension    Chronic venous insufficiency        PLAN  Continuation of supportive care for mobilization/transfer needs, as well as any ADL assistance.  Blood pressure is at goal, systolic blood pressure being less than 150.  Review of vital signs demonstrates relative hemodynamic stability.  Continue to follow her nutritional status.  No reports of any aspiration since last provider evaluation.  Behavior appears stable, continue cognitive behavioral therapy for any anxiety that should arise as a result of expected sundowning due to her advanced age and expected progression of her dementia.  Continue to avoid prolonged fasting periods, maintain adequate hydration as best able.  Routine surveillance of her blood glucose control, changes to her treatment regimen if needed.  Continue surveillance labs, adjustment of thyroid supplementation based on her TSH/free T4 results.    [x]  Discussed Patient in detail with nursing/staff, addressed all needs today.     [x]  Plan of Care Reviewed   []  PT/OT Reviewed   []  Order Changes  []  Discharge Plans  Reviewed   []  Code Status Changes            Sharad Bell  04/17/2019

## 2019-09-30 NOTE — PROGRESS NOTES
Nursing Home Follow Up Note      Sharad Bell DO []   [facility] [x]  [address] [place] [address]  Phone: [phone]  Fax: [phone] Bri Burns MD []    Salvador Murillo DO []   [address] [place] [address]  Phone: [phone]  Fax: [phone]     PATIENT NAME: Roxann Brooks                                                                          YOB: 1929           DATE OF SERVICE: 09/30/2019  FACILITY:  [][place]   [] [place]   [x] [facility]   [] [facility]   [] Other ______________________________________________________________________      CHIEF COMPLAINT:  Decreased appetite      HISTORY OF PRESENT ILLNESS:   Patient seen per staff request, for decreased appetite.  Patient has had a poor appetite for a period of time and has been treated with Remeron.  Staff requesting a different medication, as the Remeron does not seem to be helping.     PAST MEDICAL & SURGICAL HISTORY:   No past medical history on file.   No past surgical history on file.      MEDICATIONS:  I have reviewed and reconciled the patients medication list in the patients chart at the skilled nursing facility today.      ALLERGIES:    Allergies not on file      SOCIAL HISTORY:    Social History     Socioeconomic History   • Marital status:      Spouse name: Not on file   • Number of children: Not on file   • Years of education: Not on file   • Highest education level: Not on file       FAMILY HISTORY:    No family history on file.    REVIEW OF SYSTEMS:    Review of Systems   Constitutional: Positive for appetite change. Negative for activity change, chills, diaphoresis, fatigue, fever, unexpected weight gain and unexpected weight loss.   HENT: Negative for congestion, ear pain, mouth sores, nosebleeds, postnasal drip, rhinorrhea, sinus pressure, sneezing, sore throat, swollen glands and trouble swallowing.    Eyes: Negative for blurred vision, pain, discharge, redness, itching  and visual disturbance.   Respiratory: Negative for apnea, cough, choking, chest tightness, shortness of breath, wheezing and stridor.    Cardiovascular: Negative for chest pain, palpitations and leg swelling.   Gastrointestinal: Negative for abdominal distention, abdominal pain, blood in stool, constipation, diarrhea, nausea, vomiting, GERD and indigestion.   Endocrine: Negative for polydipsia and polyuria.   Genitourinary: Negative for decreased urine volume, difficulty urinating, dysuria, flank pain, frequency, hematuria, urgency and urinary incontinence.   Musculoskeletal: Positive for arthralgias. Negative for back pain, gait problem, joint swelling and myalgias.   Skin: Negative for color change, dry skin, rash and skin lesions.   Allergic/Immunologic: Negative for environmental allergies.   Neurological: Positive for weakness, memory problem and confusion. Negative for dizziness, seizures and speech difficulty.   Psychiatric/Behavioral: Negative for behavioral problems, dysphoric mood, hallucinations, sleep disturbance and depressed mood. The patient is not nervous/anxious.    All other systems reviewed and are negative.        PHYSICAL EXAMINATION:   VITAL SIGNS: BP: 144/61   HR: 62    RR: 16     T: 98.1   O2: 95%      Wt: 160    Physical Exam   Constitutional: She appears well-developed and well-nourished.   HENT:   Head: Normocephalic.   Right Ear: External ear normal.   Left Ear: External ear normal.   Nose: Nose normal.   Eyes: Conjunctivae are normal.   Neck: Normal range of motion.   Cardiovascular: Normal rate, regular rhythm, normal heart sounds and intact distal pulses.   Pulmonary/Chest: Effort normal and breath sounds normal. No respiratory distress. She has no wheezes. She has no rales.   Abdominal: Soft. Bowel sounds are normal. She exhibits no distension and no mass. There is no tenderness. No hernia.   Musculoskeletal: She exhibits no edema.   NROM all major joints    LE weakness    Neurological: She is alert.   Skin: Skin is warm and dry. No rash noted.   Psychiatric: She has a normal mood and affect.   Nursing note and vitals reviewed.      RECORDS REVIEW:   I have reviewed and interpreted lab results 7/2/19: BUN 40   Creat 1.4   GFR 35   Hgb A1C 8.9; 5/7/19: Hgb 12.6   Hct 39.3   T4 7.7     TSH 2.558    ASSESSMENT     Diagnoses and all orders for this visit:    Late onset Alzheimer's disease without behavioral disturbance    Decreased appetite    Type 2 diabetes mellitus without complication, without long-term current use of insulin (CMS/[facility])    Essential hypertension    Impaired mobility and ADLs        PLAN    Alzheimer's symptoms stable with no acute behavior changes. Weight stable but has decreased appetite. Stop Remeron. Start Marinol 2.5mg BID, 1 hour prior to lunch and dinner for better appetite stimulation.  Will reassess Marinol dose in 2 weeks.     DM stable with no reports of cyclical HG. A1C 8.9 on 7/2/19.    BP at goal on current medications.     Staff to continue supportive care for all ADLs.    [x]  Discussed Patient in detail with nursing/staff, addressed all needs today.     [x]  Plan of Care Reviewed   []  PT/OT Reviewed   [x]  Order Changes  []  Discharge Plans Reviewed  [x]  Advance Directive on file with Nursing Home.   [x]  POA on file with Nursing Home.   [x]  Code Status listed: []  Full Code   []  DNR

## 2019-10-17 NOTE — PROGRESS NOTES
Nursing Home Follow Up Note      Sharad Bell DO  []  [facility]  []  [facility] [x]  [address], [place] [address]  Phone: [phone]  Fax: [phone] Bri Burns MD  []  Salvador Murillo DO  []  [address] [place] [address]  Phone: [phone]  Fax: [phone]     PATIENT NAME: Roxann Brooks                                                                          YOB: 1929           DATE OF SERVICE: October 17, 2009  FACILITY:   [] [place]    [] [place]    [x] [facility]     [] [facility]    [] Other ______________________________________________________________________     CHIEF COMPLAINT:  Decreased appetite      HISTORY OF PRESENT ILLNESS:   Nursing reports decreased appetite.  She was started on 400 mg Megace daily 2 weeks ago.  Appetite has not improved weight is down.  Patient states that she likes to eat and denies food tasting good.    REVIEW OF SYSTEMS:  Denies shortness of breath, generalized pain, chest pain, abdominal pain.  Full review of systems unable to be obtained due to mentation    PAST MEDICAL & SURGICAL HISTORY:   No past medical history on file.   No past surgical history on file.      MEDICATIONS:  I have reviewed and reconciled the patients medication list in the patients chart at the [facility] today.      ALLERGIES:  I have reviewed allergies on file at the [facility]  Allergies not on file      SOCIAL HISTORY:  Social History     Socioeconomic History   • Marital status:      Spouse name: Not on file   • Number of children: Not on file   • Years of education: Not on file   • Highest education level: Not on file       PHYSICAL EXAMINATION:   VITAL SIGNS:   BP: 134/68, HR: 68, Resp: 18, O2 Sat: 94, Temp: 98.0, Weight: 143    Constitutional: No acute distress, awake, alert, sitting up in common area  HENT: NCAT, mucous membranes moist  Respiratory: Clear to auscultation bilaterally,  respiratory effort normal, on RA   Cardiovascular: RRR, no murmurs, rubs, or gallops  Gastrointestinal: Positive bowel sounds, soft, nontender, nondistended  Psychiatric: Appropriate affect, cooperative, smiles  Neurologic: alert and interactive, no focal deficits   Skin: No rashes    RECORDS REVIEW:   I have reviewed labs available at time of visit.  The following are pertinent:  TSH 2.5    ASSESSMENT     Diagnoses and all orders for this visit:    Weight loss    Acquired hypothyroidism    Anorexia        PLAN    Hypothyroidism  - TSH 2.5 in May, recheck due to weight loss, continue levothyroxine    Weight Loss  Anorexia  - increase Megace, staff to continue to assist with feedings, snacks    If appetite continues to be a problem.  Consider palliative care consult plus or minus goals of care discussion with family    [x]  Discussed Patient in detail with nursing/staff, addressed all needs today.     [x]  Plan of Care Reviewed   []  PT/OT Reviewed   [x]  Order Changes  []  Discharge Plans Reviewed  [x]  Advance Directive on file with Nursing Home.   [x]  POA on file with Nursing Home.   [x]  Code Status: I have reviewed the CODE STATUS on file at the skilled nursing facility       SPENSER Cook.

## 2019-10-23 PROBLEM — E11.65 TYPE 2 DIABETES MELLITUS WITH HYPERGLYCEMIA, WITH LONG-TERM CURRENT USE OF INSULIN (HCC): Status: ACTIVE | Noted: 2019-01-01

## 2019-10-23 PROBLEM — Z79.4 TYPE 2 DIABETES MELLITUS WITH HYPERGLYCEMIA, WITH LONG-TERM CURRENT USE OF INSULIN (HCC): Status: ACTIVE | Noted: 2019-01-01

## 2019-10-23 NOTE — PROGRESS NOTES
Nursing Home Progress Note        Sharad Bell,  [x]  Jaylene Polanco, APRN ?  [address] [facility], [place] [address]  Phone: [phone]  Fax: [phone] Bri Burns MD ?  Salvador Murillo DO ?  [address] [place] [address]  Phone: [phone]  Fax: [phone]     PATIENT NAME: Roxann Brooks                                                                          YOB: 1929           DATE OF SERVICE: 10/9/2019  FACILITY: []  [place]  [] [place]  [x]  [facility]  [] [facility]  []  Other ______________________________________________________________________     CHIEF COMPLAINT:  Impaired mobility and ADLs/age-related physical debility/chronic medical management      HISTORY OF PRESENT ILLNESS:     [x]  Follow Up visit for coordination of long term care issues and chronic medical management needs    PAST MEDICAL & SURGICAL HISTORY:   No past medical history on file.   No past surgical history on file.      MEDICATIONS:  I have reviewed and reconciled the patients medication list in the patients chart at the skilled nursing facility today.      ALLERGIES:    Allergies not on file      SOCIAL HISTORY:    Social History     Socioeconomic History   • Marital status:      Spouse name: Not on file   • Number of children: Not on file   • Years of education: Not on file   • Highest education level: Not on file       FAMILY HISTORY:    No family history on file.    REVIEW OF SYSTEMS:    Review of Systems   Constitutional: Negative for activity change, appetite change, chills, diaphoresis, fatigue, fever, unexpected weight gain and unexpected weight loss.   HENT: Negative for congestion, ear pain, mouth sores, nosebleeds, postnasal drip, rhinorrhea, sinus pressure, sneezing, sore throat and trouble swallowing.    Eyes: Negative for blurred vision, pain, discharge, redness, itching and visual disturbance.   Respiratory: Negative for apnea, cough, choking, chest tightness,  shortness of breath, wheezing and stridor.    Cardiovascular: Negative for chest pain, palpitations and leg swelling.   Gastrointestinal: Negative for abdominal distention, abdominal pain, blood in stool, constipation, diarrhea, nausea, vomiting, GERD and indigestion.   Endocrine: Negative for polydipsia and polyuria.   Genitourinary: Negative for urinary incontinence, decreased urine volume, difficulty urinating, dysuria, flank pain, frequency, hematuria and urgency.   Musculoskeletal: Positive for arthralgias. Negative for back pain, gait problem, joint swelling and myalgias.   Skin: Negative for color change, dry skin, rash and skin lesions.   Allergic/Immunologic: Negative for environmental allergies.   Neurological: Positive for weakness, memory problem and confusion. Negative for dizziness, seizures and speech difficulty.   Psychiatric/Behavioral: Negative for behavioral problems, dysphoric mood, hallucinations, sleep disturbance and depressed mood. The patient is not nervous/anxious.    PHYSICAL EXAMINATION:   VITAL SIGNS: /70, HR 66 bpm, RR 18, weight 155      Physical Exam   Constitutional: She appears well-developed and well-nourished.   HENT:   Head: Normocephalic and atraumatic.   Right Ear: External ear normal.   Left Ear: External ear normal.   Nose: Nose normal.   Mouth/Throat: Oropharynx is clear and moist.   Eyes: Conjunctivae and EOM are normal. Pupils are equal, round, and reactive to light. No scleral icterus.   Neck: Normal range of motion. Neck supple. No tracheal deviation present. No thyromegaly present.   Cardiovascular: Normal rate, regular rhythm, normal heart sounds and intact distal pulses. Exam reveals no gallop and no friction rub.   No murmur heard.  Pulmonary/Chest: Effort normal and breath sounds normal. No respiratory distress. She has no wheezes. She exhibits no tenderness.   Abdominal: Soft. Bowel sounds are normal. She exhibits no distension and no mass. There is no  tenderness. There is no rebound and no guarding. No hernia.   Musculoskeletal: She exhibits edema (Chronic gravity dependent venous insufficiency of lower extremities).   Frail general build.  Poor core strength/stability.    Neurological: She is alert.   Skin: Skin is warm and dry. Capillary refill takes 2 to 3 seconds. No rash noted. No erythema. No pallor.   Age-related atrophy of the skin.   Psychiatric: She has a normal mood and affect.   No acute changes.    Nursing note and vitals reviewed.        Urinary:     []  Continent  [x]  Incontinent  []  Retention  []  F/C     []  UTI w/treatment in progress       ASSESSMENT     Diagnoses and all orders for this visit:    Impaired mobility and ADLs    Acquired hypothyroidism    Late onset Alzheimer's disease without behavioral disturbance (CMS/[facility])    Age-related physical debility    Essential hypertension    Chronic venous insufficiency    Type 2 diabetes mellitus without complication, without long-term current use of insulin (CMS/[facility])        PLAN  Continue supportive care for mobilization/transfer needs, as well as ADL assistance.  BP is at goal, vital signs demonstrates relative hemodynamic stability.  Continue to follow nutritional status.  Clinically, behavior appears stable and without acute decompensation or issues reported from staff/nursing, planned continuation of cognitive behavioral therapy for any anxiety that should arise as a result of expected sundowning, due to expected progression of her dementia.  Avoid prolonged fasting periods, maintain adequate hydration as best able.  Strict blood glucose control, changes to her treatment regimen if needed.  Continue current surveillance labs, adjustment of thyroid supplementation based on her TSH/free T4 as needed    [x]  Discussed Patient in detail with nursing/staff, addressed all needs today.     [x]  Plan of Care Reviewed   []  PT/OT Reviewed   []  Order Changes  []  Discharge Plans Reviewed   []  Code  Status Changes         Sharad Bell  10/9/2019

## 2019-10-24 NOTE — PROGRESS NOTES
Nursing Home Follow Up Note      Sharad Bell DO []   [facility] [x]  [address] [place] [address]  Phone: [phone]  Fax: [phone] Bri Burns MD []    Salvador Murillo DO []   [address] [place] [address]  Phone: [phone]  Fax: [phone]     PATIENT NAME: Roxann Brooks                                                                          YOB: 1929           DATE OF SERVICE: 10/22/2019  FACILITY:  [][place]   [] [place]   [x] [facility]   [] [facility]   [] Other ______________________________________________________________________          CHIEF COMPLAINT:    CMM and LTC      HISTORY OF PRESENT ILLNESS:     Coordination of LTC needs and chronic conditions: DM, HTN, Dementia      PAST MEDICAL & SURGICAL HISTORY:   History reviewed. No pertinent past medical history.   History reviewed. No pertinent surgical history.      MEDICATIONS:  I have reviewed and reconciled the patients medication list in the patients chart at the skilled nursing facility today.      ALLERGIES:    Allergies not on file      SOCIAL HISTORY:    Social History     Socioeconomic History   • Marital status:      Spouse name: Not on file   • Number of children: Not on file   • Years of education: Not on file   • Highest education level: Not on file       FAMILY HISTORY:    History reviewed. No pertinent family history.    REVIEW OF SYSTEMS:    Review of Systems   Constitutional: Positive for appetite change. Negative for activity change, chills, diaphoresis, fatigue, fever, unexpected weight gain and unexpected weight loss.        Megace increased 5 days ago, for decreased appetite. Will monitor   HENT: Negative for congestion, ear pain, mouth sores, nosebleeds, postnasal drip, rhinorrhea, sinus pressure, sneezing, sore throat, swollen glands and trouble swallowing.    Eyes: Negative for blurred vision, pain, discharge, redness, itching and visual disturbance.    Respiratory: Negative for apnea, cough, choking, chest tightness, shortness of breath, wheezing and stridor.    Cardiovascular: Negative for chest pain, palpitations and leg swelling.   Gastrointestinal: Negative for abdominal distention, abdominal pain, blood in stool, constipation, diarrhea, nausea, vomiting, GERD and indigestion.   Endocrine: Negative for polydipsia and polyuria.   Genitourinary: Negative for decreased urine volume, difficulty urinating, dysuria, flank pain, frequency, hematuria, urgency and urinary incontinence.   Musculoskeletal: Positive for arthralgias. Negative for back pain, gait problem, joint swelling and myalgias.   Skin: Negative for color change, dry skin, rash and skin lesions.   Allergic/Immunologic: Negative for environmental allergies.   Neurological: Positive for weakness, memory problem and confusion. Negative for dizziness, seizures and speech difficulty.   Psychiatric/Behavioral: Negative for behavioral problems, dysphoric mood, hallucinations, sleep disturbance and depressed mood. The patient is not nervous/anxious.    All other systems reviewed and are negative.    Exam re-performed and the findings are noted.    PHYSICAL EXAMINATION:   VITAL SIGNS: BP: 134/68   HR: 68    RR: 18     T: 98.0   O2: 94%      Wt: 143    Physical Exam   Constitutional: She appears well-developed and well-nourished.   HENT:   Head: Normocephalic.   Right Ear: External ear normal.   Left Ear: External ear normal.   Nose: Nose normal.   Eyes: Conjunctivae are normal.   Neck: Normal range of motion.   Cardiovascular: Normal rate, regular rhythm, normal heart sounds and intact distal pulses.   Pulmonary/Chest: Effort normal and breath sounds normal. No respiratory distress. She has no wheezes. She has no rales.   Abdominal: Soft. Bowel sounds are normal. She exhibits no distension and no mass. There is no tenderness. No hernia.   Musculoskeletal: She exhibits no edema.   NROM all major joints    LE  weakness   Neurological: She is alert.   Skin: Skin is warm and dry. No rash noted.   Psychiatric: She has a normal mood and affect. Cognition and memory are impaired.   Nursing note and vitals reviewed.    Exam re-performed and the findings are noted.       RECORDS REVIEW:   I have reviewed and interpreted lab results       ASSESSMENT     Diagnoses and all orders for this visit:    Type 2 diabetes mellitus without complication, without long-term current use of insulin (CMS/[facility])    Late onset Alzheimer's disease without behavioral disturbance (CMS/[facility])    Essential hypertension    Impaired mobility and ADLs        PLAN    DM controlled with no reports of hypo/hyperglycemia.    Alzheimer's symptoms stable with no acute behavior changes. Will c/t monitor appetite and weight, with increase in Megace last week.     BP at goal on current medications.     Staff to continue supportive care for all ADLs.    [x]  Discussed Patient in detail with nursing/staff, addressed all needs today.     [x]  Plan of Care Reviewed   []  PT/OT Reviewed   [x]  Order Changes  []  Discharge Plans Reviewed  [x]  Advance Directive on file with Nursing Home.   [x]  POA on file with Nursing Home.   [x]  Code Status listed: []  Full Code   []  DNR

## 2019-12-18 NOTE — PROGRESS NOTES
Nursing Home Progress Note        Sharad Bell DO [x]  [facility] []  [address] [place] [address]  Phone: [phone]  Fax: [phone] Bri Burns MD []  Salvador Murillo DO []  [address] [place] [address]  Phone: [phone]  Fax: [phone]     PATIENT NAME: Roxann Brooks                                                                          YOB: 1929           DATE OF SERVICE: 11/06/2019  FACILITY: []  [place]  [] [place]  [x]  [facility]  [] [facility]  []  Other ______________________________________________________________________     CHIEF COMPLAINT:  Chronic medical management and long-term care.      HISTORY OF PRESENT ILLNESS:   [x]  Follow Up visit for coordination of long term care issues and chronic medical management of Diagnoses and all orders for this visit:    Age-related physical debility    Impaired mobility and ADLs    Late onset Alzheimer's disease without behavioral disturbance (CMS/HCC)    Type 2 diabetes mellitus without complication, without long-term current use of insulin (CMS/HCC)    Essential hypertension    Chronic venous insufficiency    Acquired hypothyroidism          PAST MEDICAL & SURGICAL HISTORY:   No past medical history on file.   No past surgical history on file.      MEDICATIONS:  I have reviewed and reconciled the patients medication list in the patients chart at the skilled nursing facility today.      ALLERGIES:    Allergies not on file      SOCIAL HISTORY:    Social History     Socioeconomic History   • Marital status:      Spouse name: Not on file   • Number of children: Not on file   • Years of education: Not on file   • Highest education level: Not on file       FAMILY HISTORY:    No family history on file.    REVIEW OF SYSTEMS:    Review of Systems  · Appetite: Fair [x]   Good []   Poor []   Weight Loss, 143 [x]  []  Weight Stable   Unavoidable Weight Loss []  Tolerating Tube Feeding []     Supplements Provided []   Constitutional: Negative for activity change, appetite change, chills, diaphoresis, fatigue, fever, unexpected weight gain and unexpected weight loss.   HENT: Negative for congestion, ear pain, mouth sores, nosebleeds, postnasal drip, rhinorrhea, sinus pressure, sneezing, sore throat and trouble swallowing.    Eyes: Negative for blurred vision, pain, discharge, redness, itching and visual disturbance.   Respiratory: Negative for apnea, cough, choking, chest tightness, shortness of breath, wheezing and stridor.    Cardiovascular: Negative for chest pain, palpitations and leg swelling.   Gastrointestinal: Negative for abdominal distention, abdominal pain, blood in stool, constipation, diarrhea, nausea, vomiting, GERD and indigestion.   Endocrine: Negative for polydipsia and polyuria.   Genitourinary: Negative for urinary incontinence, decreased urine volume, difficulty urinating, dysuria, flank pain, frequency, hematuria and urgency.   Musculoskeletal: Positive for arthralgias. Negative for back pain, gait problem, joint swelling and myalgias.   Skin: Negative for color change, dry skin, rash and skin lesions.   Allergic/Immunologic: Negative for environmental allergies.   Neurological: Positive for weakness, memory problem and confusion. Negative for dizziness, seizures and speech difficulty.   Psychiatric/Behavioral: Negative for behavioral problems, dysphoric mood, hallucinations, sleep disturbance and depressed mood. The patient is not nervous/anxious.    PHYSICAL EXAMINATION:   VITAL SIGNS:   Vitals:    11/06/19 1447   BP: 106/64   Pulse: 68   Resp: 16   SpO2: 95%       Physical Exam    General Appearance:  [x]  Alert   [x]  Oriented x person  [x]  No acute distress     [x]  Confused  []  Disoriented   []  Comatose   Head:  Atraumatic and normocephalic, without obvious abnormality.   Eyes:         PERRLA, conjunctivae and sclerae normal, no Icterus. No pallor. Extra-occular movements are  within normal limits.   Ears:  Ears appear intact with no abnormalities noted.   Throat: No oral lesions, no thrush, oral mucosa moist.   Neck: Supple, trachea midline, no thyromegaly, no carotid bruit.   Back:   No kyphoscoliosis. No tenderness to palpation.   Lungs:   Chest shape is normal. Breath sounds heard bilaterally equally.  No wheezing.  Bilateral basal crackles heard.    Heart:  Normal S1 and S2, no murmur, no gallop, no rub. No JVD.   Abdomen:   Normal bowel sounds, no masses, no organomegaly. Soft, non-tender, non-distended, no guarding    Extremities: Moves all extremities; without edema, cyanosis or clubbing.    thin, frail build.   Poor core strength and stability.   Pulses: Pulses palpable and equal bilaterally.   Skin: No bleeding or rash.  Generalized dry skin noted.  Age-related atrophy of skin.   Neurologic: [x] Normal speech []  Normal mental status    [x] Cranial nerves II through XII intact   [x]  No anosmia [x]  DTR 2+ [x]  Proprioception intact  [x]  Chronic neuromuscular deficits of advanced dementia and age-related physical debility.      Psych/Mood:                    [x]  No acute changes []  Depressed  Urinary:                            []  Continent  [x]  Incontinent []  Retention  []  F/C      []  UTI w/treatment in progress         ASSESSMENT     Diagnoses and all orders for this visit:    1. Age-related physical debility (Primary)    2. Impaired mobility and ADLs    3. Late onset Alzheimer's disease without behavioral disturbance (CMS/HCC)    4. Type 2 diabetes mellitus without complication, without long-term current use of insulin (CMS/[facility])    5. Essential hypertension    6. Chronic venous insufficiency    7. Acquired hypothyroidism          PLAN  Plan continuation of thyroid supplementation as long as able.  Routine surveillance labs to monitor function.    Continue supportive care as needed for all mobilization/transfer needs, as well as ADL assistance.    No acute behavioral  changes noted at this time, patient sleeping well.  Discouraged with patient's 12 pound weight change since last visit, continue to follow clinically her nutritional intake.    Blood pressure is at goal, vital signs demonstrate hemodynamic stability.  Continue to avoid nephrotoxic medications as best able, as well as further changes to her treatment regimen if needed.    No reports of any cyclical/persistent hypoglycemic episodes, have recommended continued efforts to avoid prolonged fasting periods, maintain adequate hydration status.    [x]  Discussed Patient in detail with nursing/staff, addressed all needs today.     [x]  Plan of Care Reviewed   []  PT/OT Reviewed   []  Order Changes  []  Discharge Plans Reviewed   []  Code Status Changes         Sharad Bell DO  11/06/2019

## 2020-01-08 PROBLEM — N18.30 CHRONIC KIDNEY DISEASE, STAGE 3 (HCC): Chronic | Status: ACTIVE | Noted: 2020-01-01

## 2020-02-10 ENCOUNTER — TELEPHONE (OUTPATIENT)
Dept: FAMILY MEDICINE CLINIC | Facility: CLINIC | Age: 85
End: 2020-02-10

## 2020-02-10 NOTE — TELEPHONE ENCOUNTER
Daughter, Nazia Peters, (has oral disclosure privileges) calls. States that at appt last week you had recommended an OTC med but patient is unable to remember what it was or what it was for. I did not find any clues in visit note. Wondering if you are able to recall. Thank you. Staff-please call patient's home phone. Please Advise.

## 2020-02-10 NOTE — TELEPHONE ENCOUNTER
[facility]RA [facility] CALLED  ASKING ABOUT GETTING THE DEATH CERTIFICATE  SIGNED AND SENT BACK     PLEASE ADVISE AND GIVE CALL  [phone]